# Patient Record
Sex: FEMALE | URBAN - METROPOLITAN AREA
[De-identification: names, ages, dates, MRNs, and addresses within clinical notes are randomized per-mention and may not be internally consistent; named-entity substitution may affect disease eponyms.]

---

## 2019-05-06 ENCOUNTER — APPOINTMENT (RX ONLY)
Dept: URBAN - METROPOLITAN AREA CLINIC 15 | Facility: CLINIC | Age: 34
Setting detail: DERMATOLOGY
End: 2019-05-06

## 2019-05-06 DIAGNOSIS — Z41.9 ENCOUNTER FOR PROCEDURE FOR PURPOSES OTHER THAN REMEDYING HEALTH STATE, UNSPECIFIED: ICD-10-CM

## 2019-05-06 PROCEDURE — ? COOLSCULPTING

## 2019-05-06 PROCEDURE — ? ADDITIONAL NOTES

## 2019-05-06 NOTE — HPI: COSMETIC (COOLSCULPTING)
Have You Had Previous Coolsculpting Treatments Before?: has not had previous treatments
Additional History: The patient is from the [address] [place] [address] and is here because she would like to do the Coolsculpting procedure on her lower abdomen today.

## 2019-05-06 NOTE — PROCEDURE: COOLSCULPTING
Indication: non-invasive fat removal
Detail Level: Zone
Intro: Patient was here for CoolSculpting treatment on the xxx area. \\nPatient signed the consent form, photo release form and had the opportunity to ask questions. Baseline pictures were taken. \\nWeight: xxx lb. \\n\\nPrior to treatment, the area was cleaned with alcohol (pretreatment skin wipes) and marked out with a marking pen. The gel sheet was then applied uniformly. The applicator was applied to the skin with good contact and suction. \\n\\nxxx area was treated with CoolAdvantageâ¢ 6.4 x 2 applicators.
Post-Care Instructions: I reviewed with the patient in detail post-care instructions. Patient should stay away from the sun and wear sun protection until treated areas are fully healed.
Price (Use Numbers Only, No Special Characters Or $): 0
Device: Wipit
Consent: Written consent obtained, risks reviewed including but not limited to blistering from suction, darker or lighter pigmentary change, bruising, and/or need for multiple treatments.
Post Treatment: After treatment, the suction was turned off, and the applicator was removed from the skin. \\nA massage of 2 minutes of duration was performed area immediately after removing the applicators. \\nTreatment was well tolerated without complications. \\n\\nPatient reported 0 pain by the time of leaving. The area had mild erythema; it was numbed and had no bruising, blanching or signs of epidermal damage. \\nI gave home instructions with our phone and email contacts and I suggested drinking a lot of water, avoiding anti-inflammatories for 2 weeks (except Tylenol), Vitamin D3 3,000 UI, Vitamin C 1,000 mg and Zinc 50 mg once a day for 3 months and follow up in 1 month.